# Patient Record
Sex: FEMALE | Race: OTHER | HISPANIC OR LATINO
[De-identification: names, ages, dates, MRNs, and addresses within clinical notes are randomized per-mention and may not be internally consistent; named-entity substitution may affect disease eponyms.]

---

## 2024-06-06 ENCOUNTER — DASHBOARD ENCOUNTERS (OUTPATIENT)
Age: 44
End: 2024-06-06

## 2024-06-06 ENCOUNTER — LAB OUTSIDE AN ENCOUNTER (OUTPATIENT)
Dept: URBAN - METROPOLITAN AREA CLINIC 98 | Facility: CLINIC | Age: 44
End: 2024-06-06

## 2024-06-06 ENCOUNTER — OFFICE VISIT (OUTPATIENT)
Dept: URBAN - METROPOLITAN AREA CLINIC 98 | Facility: CLINIC | Age: 44
End: 2024-06-06
Payer: SELF-PAY

## 2024-06-06 VITALS
TEMPERATURE: 97.4 F | SYSTOLIC BLOOD PRESSURE: 145 MMHG | HEIGHT: 64 IN | BODY MASS INDEX: 33.02 KG/M2 | WEIGHT: 193.4 LBS | DIASTOLIC BLOOD PRESSURE: 95 MMHG | HEART RATE: 72 BPM

## 2024-06-06 DIAGNOSIS — B18.1 CHRONIC HEPATITIS B: ICD-10-CM

## 2024-06-06 DIAGNOSIS — R10.13 EPIGASTRIC ABDOMINAL PAIN: ICD-10-CM

## 2024-06-06 PROBLEM — 61977001: Status: ACTIVE | Noted: 2024-06-06

## 2024-06-06 PROCEDURE — 99204 OFFICE O/P NEW MOD 45 MIN: CPT | Performed by: INTERNAL MEDICINE

## 2024-06-06 PROCEDURE — 99244 OFF/OP CNSLTJ NEW/EST MOD 40: CPT | Performed by: INTERNAL MEDICINE

## 2024-06-06 NOTE — HPI-TODAY'S VISIT:
Here on referral from Carolin Coles NP for CHB and abd pain a  of this note will be sent to her attention.  Daughter Beatriz and Kaitlyn with her today Pt did have colon 2017 Dr Cooper : random bx  benign  They bring referral and labs 2/24  cr 0.65 na 136 tbili 0.7 alp 83 ast 16 alt 20 alb 4.8  ferr 244 VALENTE neg  hb 14 plt 409  2023 HBV  intl unit / ml  US : normal 2016   . Pt's main concern is several months of sx - having stomach pain epigastric - everytime she eats , it hurts ; fruit, juice , soda - black berry ; blueberry ; spicy  no dysphagia  last hours to all day  tried acid reflux meds - no help . mother with hbv - and breast cancer, never on HBV treatment  pt's kids are negative for hbv and got vaccinated

## 2024-06-13 ENCOUNTER — TELEPHONE ENCOUNTER (OUTPATIENT)
Dept: URBAN - METROPOLITAN AREA CLINIC 98 | Facility: CLINIC | Age: 44
End: 2024-06-13

## 2024-06-13 ENCOUNTER — OFFICE VISIT (OUTPATIENT)
Dept: URBAN - METROPOLITAN AREA LAB 3 | Facility: LAB | Age: 44
End: 2024-06-13

## 2024-06-17 ENCOUNTER — TELEPHONE ENCOUNTER (OUTPATIENT)
Dept: URBAN - METROPOLITAN AREA CLINIC 98 | Facility: CLINIC | Age: 44
End: 2024-06-17

## 2024-06-17 ENCOUNTER — LAB OUTSIDE AN ENCOUNTER (OUTPATIENT)
Dept: URBAN - METROPOLITAN AREA CLINIC 98 | Facility: CLINIC | Age: 44
End: 2024-06-17

## 2024-11-27 ENCOUNTER — OFFICE VISIT (OUTPATIENT)
Dept: URBAN - METROPOLITAN AREA SURGERY CENTER 18 | Facility: SURGERY CENTER | Age: 44
End: 2024-11-27

## 2024-11-27 ENCOUNTER — TELEPHONE ENCOUNTER (OUTPATIENT)
Dept: URBAN - METROPOLITAN AREA CLINIC 98 | Facility: CLINIC | Age: 44
End: 2024-11-27

## 2025-01-27 ENCOUNTER — OFFICE VISIT (OUTPATIENT)
Dept: URBAN - METROPOLITAN AREA CLINIC 98 | Facility: CLINIC | Age: 45
End: 2025-01-27

## 2025-02-04 ENCOUNTER — OFFICE VISIT (OUTPATIENT)
Dept: URBAN - METROPOLITAN AREA CLINIC 98 | Facility: CLINIC | Age: 45
End: 2025-02-04

## 2025-03-11 ENCOUNTER — OFFICE VISIT (OUTPATIENT)
Dept: URBAN - METROPOLITAN AREA CLINIC 98 | Facility: CLINIC | Age: 45
End: 2025-03-11
Payer: COMMERCIAL

## 2025-03-11 VITALS
SYSTOLIC BLOOD PRESSURE: 120 MMHG | TEMPERATURE: 96.1 F | WEIGHT: 193.4 LBS | BODY MASS INDEX: 33.02 KG/M2 | HEIGHT: 64 IN | HEART RATE: 70 BPM | DIASTOLIC BLOOD PRESSURE: 79 MMHG

## 2025-03-11 DIAGNOSIS — B18.1 CHRONIC HEPATITIS B: ICD-10-CM

## 2025-03-11 PROCEDURE — 99214 OFFICE O/P EST MOD 30 MIN: CPT | Performed by: INTERNAL MEDICINE

## 2025-03-11 NOTE — HPI-TODAY'S VISIT:
Here on referral from Carolin Coles NP for CHB and abd pain a  of this note will be sent to her attention.  Daughter Beatriz and Kaitlyn with her today Pt did have colon 2017 Dr Cooper : random bx  benign  They bring referral and labs 2/24  cr 0.65 na 136 tbili 0.7 alp 83 ast 16 alt 20 alb 4.8  ferr 244 VALENTE neg  hb 14 plt 409  2023 HBV  intl unit / ml  US : normal 2016   . Pt's main concern is several months of sx - having stomach pain epigastric - everytime she eats , it hurts ; fruit, juice , soda - black berry ; blueberry ; spicy  no dysphagia  last hours to all day  tried acid reflux meds - no help . mother with hbv - and breast cancer, never on HBV treatment  pt's kids are negative for hbv and got vaccinated  3/11/25 had ultrasound 6/2025,  no masses   low HBV DNA, sl higher

## 2025-03-17 ENCOUNTER — LAB OUTSIDE AN ENCOUNTER (OUTPATIENT)
Dept: URBAN - METROPOLITAN AREA CLINIC 98 | Facility: CLINIC | Age: 45
End: 2025-03-17

## 2025-03-17 LAB
AG RATIO: 1
ALBUMIN LEVEL: 4.7
ALK PHOS: 71
ALT: 22
ANION GAP: 12
AST: 19
BILIRUBIN TOTAL: 0.5
BUN/CREAT RATIO: 20
BUN: 14
CALCIUM LEVEL: 9.3
CHLORIDE LEVEL: 104
CO2 LEVEL: 21
CREATININE LEVEL: 0.7
GFR 2021: >60
GLUCOSE LEVEL: 71
HEP B SURF AB: (no result)
HEP B SURF AG: REACTIVE
OSMO (CALC): 273
PERFORMING LAB: (no result)
POTASSIUM LEVEL: 3.8
PROTEIN TOTAL: 8.2
SODIUM LEVEL: 137

## 2025-03-18 ENCOUNTER — LAB OUTSIDE AN ENCOUNTER (OUTPATIENT)
Dept: URBAN - METROPOLITAN AREA CLINIC 98 | Facility: CLINIC | Age: 45
End: 2025-03-18

## 2025-03-19 LAB
AFP TUMOR MARKER: (no result)
HEPATITIS B DNA QN: (no result)
PERFORMING LAB: (no result)
PERFORMING LAB: (no result)

## 2025-03-25 ENCOUNTER — TELEPHONE ENCOUNTER (OUTPATIENT)
Dept: URBAN - METROPOLITAN AREA CLINIC 98 | Facility: CLINIC | Age: 45
End: 2025-03-25

## 2025-04-10 ENCOUNTER — LAB OUTSIDE AN ENCOUNTER (OUTPATIENT)
Dept: URBAN - METROPOLITAN AREA CLINIC 98 | Facility: CLINIC | Age: 45
End: 2025-04-10

## 2025-04-11 LAB
PERFORMING LAB: (no result)
PERFORMING LAB: (no result)

## 2025-04-14 LAB — PERFORMING LAB: (no result)

## 2025-04-15 ENCOUNTER — TELEPHONE ENCOUNTER (OUTPATIENT)
Dept: URBAN - METROPOLITAN AREA CLINIC 98 | Facility: CLINIC | Age: 45
End: 2025-04-15

## 2025-04-17 LAB
PERFORMING LAB: (no result)
SOURCE: (no result)

## 2025-06-16 ENCOUNTER — P2P PATIENT RECORD (OUTPATIENT)
Age: 45
End: 2025-06-16